# Patient Record
Sex: FEMALE | URBAN - METROPOLITAN AREA
[De-identification: names, ages, dates, MRNs, and addresses within clinical notes are randomized per-mention and may not be internally consistent; named-entity substitution may affect disease eponyms.]

---

## 2023-06-06 ENCOUNTER — WEB ENCOUNTER (OUTPATIENT)
Dept: URBAN - METROPOLITAN AREA CLINIC 40 | Facility: CLINIC | Age: 63
End: 2023-06-06

## 2023-06-09 ENCOUNTER — OFFICE VISIT (OUTPATIENT)
Dept: URBAN - METROPOLITAN AREA CLINIC 40 | Facility: CLINIC | Age: 63
End: 2023-06-09
Payer: COMMERCIAL

## 2023-06-09 VITALS
WEIGHT: 293 LBS | DIASTOLIC BLOOD PRESSURE: 80 MMHG | HEIGHT: 63 IN | BODY MASS INDEX: 51.91 KG/M2 | SYSTOLIC BLOOD PRESSURE: 130 MMHG | HEART RATE: 82 BPM | TEMPERATURE: 96.8 F

## 2023-06-09 DIAGNOSIS — K74.60 UNSPECIFIED CIRRHOSIS OF LIVER: ICD-10-CM

## 2023-06-09 DIAGNOSIS — D69.6 THROMBOCYTOPENIA: ICD-10-CM

## 2023-06-09 DIAGNOSIS — K75.81 NONALCOHOLIC STEATOHEPATITIS (NASH): ICD-10-CM

## 2023-06-09 DIAGNOSIS — E55.9 VITAMIN D DEFICIENCY: ICD-10-CM

## 2023-06-09 PROBLEM — 34713006: Status: ACTIVE | Noted: 2023-06-09

## 2023-06-09 PROBLEM — 266468003: Status: ACTIVE | Noted: 2023-06-09

## 2023-06-09 PROBLEM — 19943007: Status: ACTIVE | Noted: 2023-06-09

## 2023-06-09 PROBLEM — 302215000: Status: ACTIVE | Noted: 2023-06-09

## 2023-06-09 PROCEDURE — 99204 OFFICE O/P NEW MOD 45 MIN: CPT | Performed by: INTERNAL MEDICINE

## 2023-06-09 RX ORDER — SULFAMETHOXAZOLE AND TRIMETHOPRIM 800; 160 MG/1; MG/1
TABLET ORAL
Qty: 10 TABLET | Status: ACTIVE | COMMUNITY

## 2023-06-09 RX ORDER — FLUTICASONE FUROATE AND VILANTEROL TRIFENATATE 100; 25 UG/1; UG/1
POWDER RESPIRATORY (INHALATION)
Qty: 60 EACH | Status: ACTIVE | COMMUNITY

## 2023-06-09 RX ORDER — TAMSULOSIN HYDROCHLORIDE 0.4 MG/1
CAPSULE ORAL
Qty: 30 CAPSULE | Status: ACTIVE | COMMUNITY

## 2023-06-09 RX ORDER — NITROFURANTOIN (MONOHYDRATE/MACROCRYSTALS) 75; 25 MG/1; MG/1
CAPSULE ORAL
Qty: 14 CAPSULE | Status: ACTIVE | COMMUNITY

## 2023-06-09 RX ORDER — OXYBUTYNIN CHLORIDE 5 MG/1
TABLET ORAL
Qty: 20 TABLET | Status: ACTIVE | COMMUNITY

## 2023-06-09 RX ORDER — ALBUTEROL SULFATE 90 UG/1
AEROSOL, METERED RESPIRATORY (INHALATION)
Qty: 8.5 GRAM | Status: ACTIVE | COMMUNITY

## 2023-06-09 RX ORDER — OXYCODONE HYDROCHLORIDE AND ACETAMINOPHEN 5; 325 MG/1; MG/1
TABLET ORAL
Qty: 20 TABLET | Status: ACTIVE | COMMUNITY

## 2023-06-09 NOTE — PHYSICAL EXAM CONSTITUTIONAL:
morbid obesity , in no acute distress , ambulating with some difficulty , normal communication ability

## 2023-06-09 NOTE — PHYSICAL EXAM HENT:
Head, normocephalic, atraumatic, Face, Face within normal limits, Ears, External ears within normal limits, Nose/Nasopharynx, External nose normal appearance, nares patent, no nasal discharge, Mouth and Throat, Oral cavity appearance normal, Lips, Appearance normal [place] Pregnancy And Lactation Text: This medication is not safe during pregnancy. It is unknown if this medication is excreted in breast milk.

## 2023-06-09 NOTE — HPI-TODAY'S VISIT:
62-year-old morbidly obese female presents after referral for incidental finding on CT scan of irregular contour of liver suspicious of cirrhosis and suggestive of portal hypertension as well as gallstones.  She has distant history of constipation with occasional blood on the tissue. At present, denies any constipation or diarrhea. she is having 1-2 bowel movements a day that are formed.  She endorses occasional abdominal pain. h/o kidney stones. She denies any reflux or heartburn.  She is followed by hematology with  for thrombocytopenia. platelets are around 30,000. She also endorses Tylenol 3 times a week as needed for pain. NO prior EGD/colonoscopy. Has SOB due to asthma/obesity. on chronic narcotics  She has no other concerns or complaints at this time. NO h/o GI bleed/encephalopathy.

## 2023-06-27 ENCOUNTER — TELEPHONE ENCOUNTER (OUTPATIENT)
Dept: URBAN - METROPOLITAN AREA CLINIC 40 | Facility: CLINIC | Age: 63
End: 2023-06-27

## 2023-06-27 LAB
AMMONIA (P): 106
ANA SCREEN, IFA: POSITIVE
DNA (DS) ANTIBODY: <1
HBSAG SCREEN: (no result)
HEP A AB, IGM: (no result)
HEP B CORE AB, IGM: (no result)
HEPATITIS C ANTIBODY: (no result)
IMMUNOGLOBULIN A: 305
INTERPRETATION: (no result)
IRON BIND.CAP.(TIBC): 320
IRON SATURATION: 32
IRON: 102
MITOCHONDRIAL (M2) ANTIBODY: <=20
SCL-70 ANTIBODY: (no result)
SJOGREN'S ANTIBODY (SS-A): (no result)
SJOGREN'S ANTIBODY (SS-B): (no result)
SM ANTIBODY: (no result)
SM/RNP ANTIBODY: (no result)
SMOOTH MUSCLE AB SCREEN: NEGATIVE
TISSUE TRANSGLUTAMINASE AB, IGA: <1
VITAMIN D,25-OH,TOTAL,IA: 15

## 2023-06-27 RX ORDER — LACTULOSE 10 G/15ML
15 ML AS NEEDED SOLUTION ORAL ONCE A DAY
Qty: 450 | OUTPATIENT
Start: 2023-06-27 | End: 2023-07-27

## 2023-06-27 RX ORDER — ERGOCALCIFEROL 1.25 MG/1
TAKE ONE CAPSULE BY MOUTH ONCE WEEKLY CAPSULE, LIQUID FILLED ORAL
Qty: 4 CAPSULES | Refills: 3 | OUTPATIENT
Start: 2023-06-27 | End: 2023-10-17

## 2023-09-15 ENCOUNTER — OFFICE VISIT (OUTPATIENT)
Dept: URBAN - METROPOLITAN AREA CLINIC 40 | Facility: CLINIC | Age: 63
End: 2023-09-15
Payer: COMMERCIAL

## 2023-09-15 ENCOUNTER — DASHBOARD ENCOUNTERS (OUTPATIENT)
Age: 63
End: 2023-09-15

## 2023-09-15 VITALS
HEIGHT: 63 IN | SYSTOLIC BLOOD PRESSURE: 126 MMHG | TEMPERATURE: 97.7 F | BODY MASS INDEX: 51.91 KG/M2 | DIASTOLIC BLOOD PRESSURE: 70 MMHG | HEART RATE: 85 BPM | WEIGHT: 293 LBS

## 2023-09-15 DIAGNOSIS — D69.6 THROMBOCYTOPENIA: ICD-10-CM

## 2023-09-15 DIAGNOSIS — K75.81 NONALCOHOLIC STEATOHEPATITIS (NASH): ICD-10-CM

## 2023-09-15 DIAGNOSIS — K74.60 UNSPECIFIED CIRRHOSIS OF LIVER: ICD-10-CM

## 2023-09-15 DIAGNOSIS — E55.9 VITAMIN D DEFICIENCY: ICD-10-CM

## 2023-09-15 PROCEDURE — 99214 OFFICE O/P EST MOD 30 MIN: CPT | Performed by: INTERNAL MEDICINE

## 2023-09-15 RX ORDER — ERGOCALCIFEROL 1.25 MG/1
TAKE ONE CAPSULE BY MOUTH ONCE WEEKLY CAPSULE, LIQUID FILLED ORAL
Qty: 4 CAPSULES | Refills: 3 | Status: ACTIVE | COMMUNITY
Start: 2023-06-27 | End: 2023-10-17

## 2023-09-15 RX ORDER — OXYCODONE HYDROCHLORIDE AND ACETAMINOPHEN 5; 325 MG/1; MG/1
TABLET ORAL
Qty: 20 TABLET | Status: ACTIVE | COMMUNITY

## 2023-09-15 RX ORDER — ALBUTEROL SULFATE 90 UG/1
AEROSOL, METERED RESPIRATORY (INHALATION)
Qty: 8.5 GRAM | Status: ACTIVE | COMMUNITY

## 2023-09-15 RX ORDER — TAMSULOSIN HYDROCHLORIDE 0.4 MG/1
CAPSULE ORAL
Qty: 30 CAPSULE | Status: ON HOLD | COMMUNITY

## 2023-09-15 RX ORDER — ERGOCALCIFEROL 1.25 MG/1
TAKE ONE CAPSULE BY MOUTH ONCE WEEKLY CAPSULE, LIQUID FILLED ORAL
Qty: 4 CAPSULES | Refills: 5
Start: 2023-06-27 | End: 2024-02-29

## 2023-09-15 RX ORDER — OXYBUTYNIN CHLORIDE 5 MG/1
TABLET ORAL
Qty: 20 TABLET | Status: ACTIVE | COMMUNITY

## 2023-09-15 RX ORDER — NITROFURANTOIN (MONOHYDRATE/MACROCRYSTALS) 75; 25 MG/1; MG/1
CAPSULE ORAL
Qty: 14 CAPSULE | Status: ON HOLD | COMMUNITY

## 2023-09-15 RX ORDER — SULFAMETHOXAZOLE AND TRIMETHOPRIM 800; 160 MG/1; MG/1
TABLET ORAL
Qty: 10 TABLET | Status: ON HOLD | COMMUNITY

## 2023-09-15 RX ORDER — FLUTICASONE FUROATE AND VILANTEROL TRIFENATATE 100; 25 UG/1; UG/1
POWDER RESPIRATORY (INHALATION)
Qty: 60 EACH | Status: ACTIVE | COMMUNITY

## 2023-09-15 NOTE — HPI-TODAY'S VISIT:
63-year-old morbidly obese female presents for f/u of cirrhosis. She has distant history of constipation with occasional blood on the tissue. At present, denies any constipation or diarrhea. she is having 1-2 bowel movements a day that are formed.  She endorses occasional abdominal pain. h/o kidney stones. She denies any reflux or heartburn.  She is followed by hematology with  for thrombocytopenia. platelets are around 30,000. She also endorses Tylenol 3 times a week as needed for pain. No prior EGD/colonoscopy. Has SOB due to asthma/obesity. on chronic narcotics  She has no other concerns or complaints at this time. No h/o GI bleed/encephalopathy. on recent labs, vit D-15. ARCELIA-positive. rest are negative. ASMA-negative. she is on Vit D supplementation. was admitted to the hospital last month for urosepsis. has to have more kidney stones removed. recently started on ozempic for weight loss. tolerating it well

## 2024-02-16 ENCOUNTER — OV EP (OUTPATIENT)
Dept: URBAN - METROPOLITAN AREA CLINIC 40 | Facility: CLINIC | Age: 64
End: 2024-02-16

## 2024-06-06 ENCOUNTER — TELEPHONE ENCOUNTER (OUTPATIENT)
Dept: URBAN - METROPOLITAN AREA CLINIC 40 | Facility: CLINIC | Age: 64
End: 2024-06-06